# Patient Record
Sex: FEMALE | Race: WHITE | Employment: OTHER | URBAN - NONMETROPOLITAN AREA
[De-identification: names, ages, dates, MRNs, and addresses within clinical notes are randomized per-mention and may not be internally consistent; named-entity substitution may affect disease eponyms.]

---

## 2017-01-01 ENCOUNTER — HOSPITAL - GICH (OUTPATIENT)
Dept: LAB | Facility: OTHER | Age: 82
End: 2017-01-01

## 2017-01-01 ENCOUNTER — HISTORY (OUTPATIENT)
Dept: MEDSURG UNIT | Facility: OTHER | Age: 82
End: 2017-01-01

## 2017-01-01 ENCOUNTER — AMBULATORY - GICH (OUTPATIENT)
Dept: GERIATRICS | Facility: OTHER | Age: 82
End: 2017-01-01

## 2017-01-01 ENCOUNTER — COMMUNICATION - GICH (OUTPATIENT)
Dept: INTERNAL MEDICINE | Facility: OTHER | Age: 82
End: 2017-01-01

## 2017-01-01 ENCOUNTER — AMBULATORY - GICH (OUTPATIENT)
Dept: SCHEDULING | Facility: OTHER | Age: 82
End: 2017-01-01

## 2017-01-01 ENCOUNTER — COMMUNICATION - GICH (OUTPATIENT)
Dept: FAMILY MEDICINE | Facility: OTHER | Age: 82
End: 2017-01-01

## 2017-01-01 ENCOUNTER — HISTORY (OUTPATIENT)
Dept: EMERGENCY MEDICINE | Facility: OTHER | Age: 82
End: 2017-01-01

## 2017-01-01 ENCOUNTER — HISTORY (OUTPATIENT)
Dept: OBGYN | Facility: OTHER | Age: 82
End: 2017-01-01

## 2017-01-01 ENCOUNTER — OFFICE VISIT - GICH (OUTPATIENT)
Dept: OBGYN | Facility: OTHER | Age: 82
End: 2017-01-01

## 2017-01-01 DIAGNOSIS — I50.9 HEART FAILURE (H): ICD-10-CM

## 2017-01-01 DIAGNOSIS — Z00.00 ENCOUNTER FOR GENERAL ADULT MEDICAL EXAMINATION WITHOUT ABNORMAL FINDINGS: ICD-10-CM

## 2017-01-01 DIAGNOSIS — I50.40 COMBINED CONGESTIVE SYSTOLIC AND DIASTOLIC HEART FAILURE (H): ICD-10-CM

## 2017-01-01 DIAGNOSIS — E03.9 HYPOTHYROIDISM: ICD-10-CM

## 2017-01-01 DIAGNOSIS — N39.0 URINARY TRACT INFECTION: ICD-10-CM

## 2017-01-01 DIAGNOSIS — F03.90 DEMENTIA WITHOUT BEHAVIORAL DISTURBANCE (H): ICD-10-CM

## 2017-01-01 DIAGNOSIS — N95.0 POSTMENOPAUSAL BLEEDING: ICD-10-CM

## 2017-01-01 DIAGNOSIS — R30.0 DYSURIA: ICD-10-CM

## 2017-01-01 DIAGNOSIS — E11.9 TYPE 2 DIABETES MELLITUS WITHOUT COMPLICATIONS (H): ICD-10-CM

## 2017-01-01 DIAGNOSIS — E87.5 HYPERKALEMIA: ICD-10-CM

## 2017-01-01 LAB
A/G RATIO - HISTORICAL: 0.5 (ref 1–2)
ABSOLUTE BASOPHILS - HISTORICAL: 0.1 THOU/CU MM
ABSOLUTE EOSINOPHILS - HISTORICAL: 0.3 THOU/CU MM
ABSOLUTE LYMPHOCYTES - HISTORICAL: 1 THOU/CU MM (ref 0.9–2.9)
ABSOLUTE MONOCYTES - HISTORICAL: 1 THOU/CU MM
ABSOLUTE NEUTROPHILS - HISTORICAL: 4.3 THOU/CU MM (ref 1.7–7)
ALBUMIN SERPL-MCNC: 2.2 G/DL (ref 3.5–5.7)
ALP SERPL-CCNC: 119 IU/L (ref 34–104)
ALT (SGPT) - HISTORICAL: 15 IU/L (ref 7–52)
ANION GAP - HISTORICAL: 10 (ref 5–18)
ANION GAP - HISTORICAL: 4 (ref 5–18)
ANION GAP - HISTORICAL: 5 (ref 5–18)
ANION GAP - HISTORICAL: 6 (ref 5–18)
AST SERPL-CCNC: 26 IU/L (ref 13–39)
BACTERIA URINE: ABNORMAL BACTERIA/HPF
BASOPHILS # BLD AUTO: 1.4 %
BILIRUB SERPL-MCNC: 0.6 MG/DL (ref 0.3–1)
BILIRUB UR QL: NEGATIVE
BNP SERPL-MCNC: 229 PG/ML
BUN SERPL-MCNC: 32 MG/DL (ref 7–25)
BUN SERPL-MCNC: 37 MG/DL (ref 7–25)
BUN SERPL-MCNC: 46 MG/DL (ref 7–25)
BUN SERPL-MCNC: 68 MG/DL (ref 7–25)
BUN/CREAT RATIO - HISTORICAL: 19
BUN/CREAT RATIO - HISTORICAL: 21
BUN/CREAT RATIO - HISTORICAL: 23
BUN/CREAT RATIO - HISTORICAL: 32
CALCIUM SERPL-MCNC: 8 MG/DL (ref 8.6–10.3)
CALCIUM SERPL-MCNC: 8.1 MG/DL (ref 8.6–10.3)
CALCIUM SERPL-MCNC: 8.5 MG/DL (ref 8.6–10.3)
CALCIUM SERPL-MCNC: 8.6 MG/DL (ref 8.6–10.3)
CHLORIDE SERPLBLD-SCNC: 109 MMOL/L (ref 98–107)
CHLORIDE SERPLBLD-SCNC: 109 MMOL/L (ref 98–107)
CHLORIDE SERPLBLD-SCNC: 111 MMOL/L (ref 98–107)
CHLORIDE SERPLBLD-SCNC: 112 MMOL/L (ref 98–107)
CLARITY, URINE: ABNORMAL CLARITY
CO2 SERPL-SCNC: 20 MMOL/L (ref 21–31)
CO2 SERPL-SCNC: 20 MMOL/L (ref 21–31)
CO2 SERPL-SCNC: 21 MMOL/L (ref 21–31)
CO2 SERPL-SCNC: 21 MMOL/L (ref 21–31)
COLOR UR: YELLOW COLOR
CREAT SERPL-MCNC: 1.72 MG/DL (ref 0.7–1.3)
CREAT SERPL-MCNC: 1.77 MG/DL (ref 0.7–1.3)
CREAT SERPL-MCNC: 2.04 MG/DL (ref 0.7–1.3)
CREAT SERPL-MCNC: 2.15 MG/DL (ref 0.7–1.3)
EOSINOPHIL NFR BLD AUTO: 4.8 %
EPITHELIAL CELLS: ABNORMAL EPI/HPF
ERYTHROCYTE [DISTWIDTH] IN BLOOD BY AUTOMATED COUNT: 14.9 % (ref 11.5–15.5)
ESTIMATED AVERAGE GLUCOSE: 108 MG/DL
FOLATE: 7.9 NG/ML
GFR IF NOT AFRICAN AMERICAN - HISTORICAL: 22 ML/MIN/1.73M2
GFR IF NOT AFRICAN AMERICAN - HISTORICAL: 23 ML/MIN/1.73M2
GFR IF NOT AFRICAN AMERICAN - HISTORICAL: 27 ML/MIN/1.73M2
GFR IF NOT AFRICAN AMERICAN - HISTORICAL: 28 ML/MIN/1.73M2
GLOBULIN - HISTORICAL: 4.8 G/DL (ref 2–3.7)
GLUCOSE SERPL-MCNC: 124 MG/DL (ref 70–105)
GLUCOSE SERPL-MCNC: 129 MG/DL (ref 70–105)
GLUCOSE SERPL-MCNC: 133 MG/DL (ref 70–105)
GLUCOSE SERPL-MCNC: 69 MG/DL (ref 70–105)
GLUCOSE URINE: NEGATIVE MG/DL
HCT VFR BLD AUTO: 34.2 % (ref 33–51)
HEMOGLOBIN A1C MONITORING (POCT) - HISTORICAL: 5.4 % (ref 4–6.2)
HEMOGLOBIN: 10.9 G/DL (ref 12–16)
KETONES UR QL: NEGATIVE MG/DL
LEUKOCYTE ESTERASE URINE: ABNORMAL
LYMPHOCYTES NFR BLD AUTO: 15 % (ref 20–44)
MCH RBC QN AUTO: 35.2 PG (ref 26–34)
MCHC RBC AUTO-ENTMCNC: 32 G/DL (ref 32–36)
MCV RBC AUTO: 110 FL (ref 80–100)
MONOCYTES NFR BLD AUTO: 15.2 %
NEUTROPHILS NFR BLD AUTO: 63.6 % (ref 42–72)
NITRITE UR QL STRIP: NEGATIVE
OCCULT BLOOD,URINE - HISTORICAL: ABNORMAL
PH UR: 6 [PH]
PLATELET # BLD AUTO: 216 THOU/CU MM (ref 140–440)
PMV BLD: 7.2 FL (ref 6.5–11)
POTASSIUM SERPL-SCNC: 4.3 MMOL/L (ref 3.5–5.1)
POTASSIUM SERPL-SCNC: 5 MMOL/L (ref 3.5–5.1)
POTASSIUM SERPL-SCNC: 5.5 MMOL/L (ref 3.5–5.1)
POTASSIUM SERPL-SCNC: 5.9 MMOL/L (ref 3.5–5.1)
PROT SERPL-MCNC: 7 G/DL (ref 6.4–8.9)
PROTEIN QUALITATIVE,URINE - HISTORICAL: ABNORMAL MG/DL
RBC - HISTORICAL: ABNORMAL /HPF
RED BLOOD COUNT - HISTORICAL: 3.1 MIL/CU MM (ref 4–5.2)
SODIUM SERPL-SCNC: 134 MMOL/L (ref 133–143)
SODIUM SERPL-SCNC: 136 MMOL/L (ref 133–143)
SODIUM SERPL-SCNC: 139 MMOL/L (ref 133–143)
SODIUM SERPL-SCNC: 139 MMOL/L (ref 133–143)
SP GR UR STRIP: 1.01
T4 FREE SERPL-MCNC: 0.85 NG/DL (ref 0.58–1.64)
TSH - HISTORICAL: 4.89 UIU/ML (ref 0.34–5.6)
TSH - HISTORICAL: 8.22 UIU/ML (ref 0.34–5.6)
UROBILINOGEN,QUALITATIVE - HISTORICAL: NORMAL EU/DL
VIT B12 SERPL-MCNC: 816 PG/ML (ref 180–914)
WBC - HISTORICAL: >100 /HPF
WHITE BLOOD COUNT - HISTORICAL: 6.7 THOU/CU MM (ref 4.5–11)

## 2018-01-02 NOTE — H&P
Patient Information     Patient Name MRN Chely Rogers [id number] Female 1933      H&P signed by Reshma Beckett NP at 2017  7:30 AM      Author:  Reshma Beckett NP Service:  (none) Author Type:  PHYS- Nurse Practitioner     Filed:  2017  7:30 AM Encounter Date:  2017 Status:  Signed     :  Reshma Beckett NP (PHYS- Nurse Practitioner)            -  DATE OF SERVICE:  2017    RAYRAY FOX     CHIEF COMPLAINT   Increased weight gain, shortness of breath, and increased TSH.     HISTORY OF PRESENT ILLNESS  The patient was seen in December and had a reduction in her Lasix dose. She had been on 40 mg in the morning and 20 mg midday. She had had a significant weight loss since Lasix was started. Her renal function had been stable. On  her Lasix was decreased down to 20 mg twice daily. Her weight is now up 16 pounds; she has increased edema and shortness of breath. Nursing staff have noticed a slightly rapid respiratory rate. The patient denies problems. She also has history of postmenopausal bleeding and has declined workup. She does occasionally have a small amount of bleeding but not more than once every 3 weeks according to staff.     On  she had a basic metabolic panel which showed a BUN of 31, creatinine 1.42, and potassium 4.2. She is not on potassium supplementation, nor is she on any ACE inhibitors. She also recently had elevated TSH. This had returned at 8. She currently is on Synthroid 25 mcg daily.     Past medical history, allergies, and medications are reviewed.     PHYSICAL EXAMINATION  GENERAL: Pleasant, morbidly obese female without acute distress. She is sitting up in her wheelchair visiting with another resident. Alert and oriented.   VITAL SIGNS: Respiratory rate 22. Blood pressure 139/73, pulse 80, temperature 96.5. Weight reviewed and up 18 pounds since .   LUNGS: Diminished breath sounds, lower lobes.    CARDIOVASCULAR: Regular rate and rhythm. Unable to auscultate S3.   NECK: Supple and without adenopathy or thyromegaly.   ABDOMEN: Soft and obese, but without masses, tenderness, and organomegaly.   EXTREMITIES: 3+ pitting edema. She does have Ace wraps in place.     ASSESSMENT  1. Congestive heart failure.   2. Morbid obesity.   3. Hypothyroidism.   4. Postmenopausal bleeding.     PLAN  1. Increase Lasix to 40 mg twice daily for one week, then reduce back down to 40 mg in the morning, 20 mg at noon. Check basic metabolic panel in 2 weeks. Also start lisinopril 2.5 mg daily. Follow up in 2 weeks.   2. Increase Synthroid to 37.5 mcg daily and check a TSH and FT4 in 6 weeks.         SRIDHAR MEADOWS/steve   D:  2017 11:40:25  T:  2017 12:12:53  Voice Job ID:  [id number]  Text Job ID:  [id number]  cc:NURSING HOME  PCP,NO, PRIMARY PHYSICIAN  VALENCIA HILARIO MD         [facility] [place], [place]:  ASTER CHEUNG  MR#:  [id number]  :  1933  DATE:  2017  LOCATION:  [facility]  ROOM:    TYPE:  CLINURSING HOME REPORTPage 1

## 2018-01-02 NOTE — PROGRESS NOTES
Patient Information     Patient Name MRN Chely Rogers [id number] Female 1933      Progress Notes by Reshma Beckett NP at 2017  4:00 AM     Author:  Reshma Beckett NP Service:  (none) Author Type:  PHYS- Nurse Practitioner     Filed:  2017  7:54 AM Encounter Date:  2017 Status:  Signed     :  Reshma Beckett NP (PHYS- Nurse Practitioner)            This note has been dictated. The encounter number is [id number].

## 2018-01-03 NOTE — PROGRESS NOTES
Patient Information     Patient Name MRN Sex Chely Tate [id number] Female 1933      Progress Notes by Rebeca Butler MD at 2017  6:15 AM     Author:  Rebeca Butler MD Service:  (none) Author Type:  Physician     Filed:  2017  1:28 PM Encounter Date:  2017 Status:  Signed     :  Rebeca Butler MD (Physician)            60 Day Re-certification    SUBJECTIVE:  Chely Barney is a 83 y.o. Female seen for recertification.  She was started on lasix for CHF, since  Increased to 40 mg BID, she has gained 6 pounds  She lays in bed most of the day. Unwilling to get up for me today.  She feels tired but has no other concerns.  Breathing okay.  BG     Nursing Notes were reviewed.    Family was not present during this visit.    Current Outpatient Prescriptions on File Prior to Visit       Medication  Sig Dispense Refill     blood sugar diagnostic (ONETOUCH ULTRA TEST) strip Use to test blood sugar 3 times daily.       cephalexin (KEFLEX) 500 mg capsule TAKE 1 CAPSULE BY MOUTH TWICE DAILY       ferrous sulfate, 65 mg elemental, 324 mg (65 mg iron) Delayed-Release tablet Take 1 tablet by mouth once daily.  0     furosemide (LASIX) 40 mg tablet        insulin glargine (LANTUS SOLOSTAR) 100 unit/mL (3 mL) solution for injection Inject 20 units daily at bedtime       insulin lispro (HUMALOG KWIKPEN) 100 unit/mL inpn pen 6 units before breakfast, 6 units before lunch, 6 units before dinner, plus correction. TDD 24 units       lancets (ONETOUCH DELICA LANCETS) 30 gauge misc TEST THREE TIMES DAILY OR AS DIRECTED       levothyroxine (SYNTHROID) 25 mcg tablet Take 25 mcg by mouth before breakfast.       medroxyPROGESTERone (PROVERA) 10 mg tablet Take 1 tablet by mouth once daily. 30 tablet prn     Melatonin 100 % powd Take 3 mg by mouth.       miconazole nitrate powder 2 % powder Use under breasts and in buttock area for rash twice a day       pen needle, diabetic (BD  "INSULIN PEN NEEDLE UF) 31 gauge x 5/16\" Use 4 pen needles daily.       No current facility-administered medications on file prior to visit.        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     Social History Main Topics       Smoking status: Not on file     Smokeless tobacco: Not on file     Alcohol use Not on file     Drug use: Not on file     Sexual activity: Not on file     Other Topics  Concern     Not on file      Social History Narrative     Preload  8/6/2013             Social History Narrative    Preload  8/6/2013                A comprehensive review of systems was negative except for items noted in HPI/Subjective.      OBJECTIVE:  130/60  70-90    EXAM:  General Appearance: Pleasant, alert, appropriate appearance for age. No acute distress  Nose Exam: Normal external nose, mucus membranes, and septum.  OroPharynx Exam: Dental hygiene adequate. Normal buccal mucosa. Normal pharynx.  Neck Exam: Supple, no masses or nodes.  Chest/Respiratory Exam: Normal chest wall and respirations. Clear to auscultation.  Cardiovascular Exam: Regular rate and rhythm. S1, S2, no murmur, click, gallop, or rubs.  Lymphatic Exam:2+ edema lower legs  Psychiatric Exam: Normal.    ASSESSMENT/Plan :    ICD-10-CM    1. Congestive heart failure, unspecified congestive heart failure chronicity, unspecified congestive heart failure type I50.9    2. Dementia without behavioral disturbance, " unspecified dementia type F03.90    3. Type 2 diabetes mellitus without complication, without long-term current use of insulin (HC) E11.9    4. Acquired hypothyroidism E03.9        CODE STATUS/PLAN OF CARE     Chely was seen today for nursing home visit.    Diagnoses and all orders for this visit:    Congestive heart failure, unspecified congestive heart failure chronicity, unspecified congestive heart failure type    Dementia without behavioral disturbance, unspecified dementia type    Type 2 diabetes mellitus without complication, without long-term current use of insulin (HC)    Acquired hypothyroidism    Interestingly she has gained weight since increased Lasix, will have daily weights and BP sent to me next week. Consider adding zaroxyln if weight gain continues.  Increase synthroid, TSH elevated at 8.2  Rebeca Butler MD    This document was created using computer generated templates and voice activated software. Every effort has been made to assure accuracy, but errors may be present

## 2018-01-03 NOTE — PROGRESS NOTES
Patient Information     Patient Name MRN Chely Rogers [id number] Female 1933      Progress Notes by Reshma Beckett NP at 3/2/2017  3:20 AM     Author:  Reshma Beckett NP Service:  (none) Author Type:  PHYS- Nurse Practitioner     Filed:  3/6/2017  7:21 AM Encounter Date:  3/2/2017 Status:  Signed     :  Reshma Beckett NP (PHYS- Nurse Practitioner)            This note has been dictated. The encounter number is [id number].

## 2018-01-03 NOTE — PROGRESS NOTES
Patient Information     Patient Name MRN Sex Chely Tate [id number] Female 1933      Progress Notes by Jm Morin MD at 2017  2:30 PM     Author:  Jm Morin MD Service:  (none) Author Type:  Physician     Filed:  2017  6:07 PM Encounter Date:  2017 Status:  Signed     :  Jm Morin MD (Physician)            SUBJECTIVE:    Chely Barney is a 83 y.o. female who presents for ED follow up for postmenopausal bleeding.    HPI  Patient is a medically complex patient with postmenopausal bleeding brought in from her nursing home about a month ago. She has had intermittent bleeding since over a year ago. [facility] was seen [facility] and refused treatment and workup at that time. She and her family have declined intervention if she were to have uterine cancer. She and her daughter are not interested in examination or diagnosis today. She was started on Provera 10 mg daily about a month ago, and has tolerated the medication. History is provided by the patient and her daughter. Her bleeding is now light and irregular.    Allergies      Allergen   Reactions     Aleve  [Naproxen]  Itching     Itchy      Ibuprofen  *Unknown     Naproxen Sodium  Angioedema     Throat swells - Aleve       Penicillins  Edema   ,   Current Outpatient Prescriptions on File Prior to Visit       Medication  Sig Dispense Refill     blood sugar diagnostic (ONETOUCH ULTRA TEST) strip Use to test blood sugar 3 times daily.       cephalexin (KEFLEX) 500 mg capsule TAKE 1 CAPSULE BY MOUTH TWICE DAILY       ferrous sulfate, 65 mg elemental, 324 mg (65 mg iron) Delayed-Release tablet Take 1 tablet by mouth once daily.  0     furosemide (LASIX) 40 mg tablet        insulin glargine (LANTUS SOLOSTAR) 100 unit/mL (3 mL) solution for injection Inject 20 units daily at bedtime       insulin lispro (HUMALOG KWIKPEN) 100 unit/mL inpn pen 6 units before breakfast, 6 units before lunch, 6 units before dinner,  "plus correction. TDD 24 units       lancets (ONETOUCH DELICA LANCETS) 30 gauge misc TEST THREE TIMES DAILY OR AS DIRECTED       levothyroxine (SYNTHROID) 25 mcg tablet Take 25 mcg by mouth before breakfast.       medroxyPROGESTERone (PROVERA) 10 mg tablet Take 1 tablet by mouth once daily. 30 tablet prn     Melatonin 100 % powd Take 3 mg by mouth.       miconazole nitrate powder 2 % powder Use under breasts and in buttock area for rash twice a day       pen needle, diabetic (BD INSULIN PEN NEEDLE UF) 31 gauge x 5/16\" Use 4 pen needles daily.       No current facility-administered medications on file prior to visit.    , No past medical history on file., No past surgical history on file. and   Social History     Substance Use Topics       Smoking status: Never Smoker     Smokeless tobacco: Not on file     Alcohol use No       REVIEW OF SYSTEMS:  ROS    OBJECTIVE:  /70  Pulse 84  Temp 97.6  F (36.4  C) (Tympanic)  Breastfeeding? No    EXAM:   Physical Exam   Wheelchair bound.   Significant edema in the lower extremities. LE are wrapped with ACE wrap today.    ASSESSMENT/PLAN:    ICD-10-CM    1. Postmenopausal bleeding N95.0         Plan:  Discussed typically that US and endometrial biopsy could be done for diagnosis and prognosis, and then tailor oral treatment for control of her symptoms. They are not currently interested in any intervention. Would recommend continued daily progestin for a presumed endometrial hyperplasia or malignancy as she seems to be controlling the bleeding and is not symptomatic from it.  Follow up as needed.  She plans to contact her PCP regarding symptoms from CHF, and LE edema.          "

## 2018-01-03 NOTE — H&P
Patient Information     Patient Name MRN Chely Rogers [id number] Female 1933      H&P signed by Reshma Beckett NP at 3/6/2017  7:13 AM      Author:  Reshma Beckett NP Service:  (none) Author Type:  PHYS- Nurse Practitioner     Filed:  3/6/2017  7:13 AM Encounter Date:  3/2/2017 Status:  Signed     :  Reshma Beckett NP (PHYS- Nurse Practitioner)            -  DATE OF SERVICE:  2017    RAYRAY FOX      CHIEF COMPLAINT  Intermittent confusion.     HISTORY OF PRESENT ILLNESS  Nursing staff report that the patient has had intermittent confusion over the past couple of days. She spends most of her day in bed sleeping and even when she is up, she is always asking to go back to bed. This is not unusual for her. She does not do a lot of snacking. The meals that she eats are usually the only ones that are provided at the nursing home. Her weight continues to climb. It is now up 19 pounds since 2017. She had been on Lasix 40 mg twice daily, but mid-February, this was reduced down to 40 mg daily due to mild increase in her creatinine.     Primary doctor did see her at the nursing home and she started her on 2 liters fluid restriction and 2 grams sodium-reduced diet. She has chronic edema of the lower extremities, but has not had cough or shortness of breath that nursing staff have noticed. In the past when she has had intermittent confusion, she has had urinary tract infection. The patient also has postmenopausal vaginal bleeding that she has refused workup. She did pass a couple of clots yesterday according to nursing staff.     LABORATORY DATA  2017 labs showed a sodium of 139, BUN 32, creatinine 1.72 and a potassium of 4.3. 2016, hemoglobin 11.6 g/dL, A1c 5.1%. The patient is on iron twice daily. By reviewing blood sugars, these average from 93 to 198.     Past medical history, allergies, and medications are reviewed.     REVIEW  OF SYSTEMS  Complete review of systems discussed with nursing staff and resident. See HPI for positive findings.     PHYSICAL EXAMINATION  GENERAL: Morbidly obese female who is 330 pounds. She is lying in bed sleeping. She did awaken some during this visit, but did not voice any concerns.   VITAL SIGNS: Blood pressure 121/65, pulse 85, respiratory rate 18, temperature 98.1. SPO2 of 93% on room air.   SKIN: Color pink.   HEENT: Mucous membranes moist.   LUNGS: Fields clear to auscultation.   CARDIOVASCULAR/LUNG: Regular. No S3 auscultated. Heart tones and lung sounds are distant secondary to morbid obesity.   ABDOMEN: Soft and without masses, tenderness, and organomegaly.   EXTREMITIES: With chronic lymphedema.     LABORATORY DATA  Discussed in history of present illness.     ASSESSMENT  1. Confusion.   2. Weight gain.   3. Congestive heart failure.   4. Stage III chronic kidney disease.   5. Iron deficiency anemia.     PLAN  We will do a lab workup since she does have intermittent confusion. She has several chronic problems which could have caused new lab abnormalities. We will check a CBC, chemistry panel, A1c and a UA/UC. We will increase her Lasix by adding 20 mg at noon. I will follow with her weights in 2 weeks and address labs once they return. With the increased dose of Lasix, hopefully her fluid overload should improve, but will need to watch her renal function closely.    SRIDHAR MEADOWS/jarod   D:  2017 10:53:06  T:  2017 11:20:48  Voice Job ID:  [id number]  Text Job ID:  [id number]  cc:NURSING HOME  PCP,MILY, PRIMARY PHYSICIAN         [facility] & [place], [place]:  ASTER CHEUNG  MR#:  [id number]  :  1933  DATE:  2017  LOCATION:  [facility] [place]  ROOM:    TYPE:  CLINURSING [place] REPORTPage 1 of 1

## 2018-01-03 NOTE — PROGRESS NOTES
Patient Information     Patient Name MRN Chely Rogers [id number] Female 1933      Progress Notes by Reshma Beckett NP at 3/13/2017  3:40 AM     Author:  Reshma Beckett NP Service:  (none) Author Type:  PHYS- Nurse Practitioner     Filed:  3/15/2017  7:36 AM Encounter Date:  3/13/2017 Status:  Signed     :  Reshma Beckett NP (PHYS- Nurse Practitioner)            This note has been dictated. The encounter number is [phone].

## 2018-01-03 NOTE — H&P
Patient Information     Patient Name MRN Chely Rogers [id number] Female 1933      H&P signed by Reshma Beckett NP at 3/16/2017  7:29 AM      Author:  Reshma Beckett NP Service:  (none) Author Type:  PHYS- Nurse Practitioner     Filed:  3/16/2017  7:29 AM Encounter Date:  3/13/2017 Status:  Signed     :  Reshma Beckett NP (PHYS- Nurse Practitioner)            -  DATE OF SERVICE:  2017    RAYRAY FOX     CHIEF COMPLAINT   60 day recertification and followup of congestive heart failure.     HISTORY OF PRESENT ILLNESS  The patient was seen a couple of weeks ago and at that time she had Lasix dose increased from 40 mg daily to an additional 20 mg at noon. Her weight has been fluctuating between 320 and 331 pounds. She denies dyspnea. She denies cough. She is always asking to be in bed. On 2017 she had a basic metabolic panel with a slight increase of her BUN and creatinine. Her sodium was normal at 136. In January, she had labs also which showed a B natriuretic peptide of 229. In March, her hemoglobin was 10.9 g/dL with an elevated MCV of 110. She is on iron sulfate and thyroid replacement. She has also had a stable TSH. She has not had B12 or folate levels obtained. She does have iron deficiency anemia from postmenopausal bleeding. May very well have some chronic anemia and vitamin deficiencies as a cause of the macrocytosis.     Past medical history, allergies, and medications are reviewed. Complete review of systems discussed with nursing staff and resident. See HPI for positive findings.     PHYSICAL EXAMINATION  GENERAL: Pleasant, morbidly obese female without distress.   VITAL SIGNS: Blood pressure 117/55. Pulse 74, respiratory rate 18, temperature 98.1, SPO2 93% on room air.   SKIN: Color pink. Mucous membranes moist.   NECK: Supple and without adenopathy.   LUNGS: Faint rales, lower lobes bilaterally and faint wheezing. No rhonchi.    CARDIOVASCULAR: Regular. No S3 auscultated. Examination is difficult as the patient is talking throughout entire examination.   ABDOMEN: Soft and obese without masses, tenderness, and organomegaly.   EXTREMITIES: Chronic 2+ edema of the lower extremities.     LABORATORY DATA  Discussed in history of present illness.     ASSESSMENT  1. Congestive heart failure.   2. Anemia.   3. Hypothyroidism.   4. Postmenopausal bleeding.   5. Stage III chronic kidney disease.     PLAN  We will obtain chest x-ray and also check B12, folic acid and BMP this week. Once labs and chest x-ray report return, will determine if she needs to be uptitrated on diuretic. We will need to monitor her renal function carefully.      SRIDHAR MEADOWS/hima   D:  2017 15:07:19  T:  03/15/2017 15:25:40  Voice Job ID:  [id number]  Text Job ID:  [id number]  cc:NURSING HOME  PCP,MILY, PRIMARY PHYSICIAN  VALENCIA HILARIO MD         [facility] & [place], [place]:  ASTER CHEUNG  MR#:  [id number]  :  1933  DATE:  2017  LOCATION:  [facility]  ROOM:    TYPE:  CLINURSING HOME REPORTPage 1 of

## 2018-01-03 NOTE — NURSING NOTE
Patient Information     Patient Name MRN Chely Rogers [id number] Female 1933      Nursing Note by Bernabe Maria LPN at 2017  2:30 PM     Author:  Bernabe Maria LPN Service:  (none) Author Type:  NURS- Licensed Practical Nurse     Filed:  2017  3:25 PM Encounter Date:  2017 Status:  Signed     :  Bernabe Maria LPN (NURS- Licensed Practical Nurse)            Patient presents to the clinic for an ER follow up. Post menopausal bleeding.  Bernabe Maria LPN ..............2017 2:25 PM

## 2018-01-04 NOTE — PROGRESS NOTES
Patient Information     Patient Name MRN Chely Rogers [id number] Female 1933      Progress Notes by Reshma Beckett NP at 3/30/2017  4:20 AM     Author:  Reshma Beckett NP Service:  (none) Author Type:  PHYS- Nurse Practitioner     Filed:  3/31/2017 12:36 PM Encounter Date:  3/30/2017 Status:  Signed     :  Reshma Beckett NP (PHYS- Nurse Practitioner)            This note has been dictated. The encounter number is [phone].

## 2018-01-04 NOTE — H&P
Patient Information     Patient Name MRN Chely Rogers [id number] Female 1933      H&P signed by Reshma Beckett NP at 4/3/2017  7:30 AM      Author:  Reshma Beckett NP Service:  (none) Author Type:  PHYS- Nurse Practitioner     Filed:  4/3/2017  7:30 AM Encounter Date:  3/30/2017 Status:  Signed     :  Reshma Beckett NP (PHYS- Nurse Practitioner)            -  DATE OF SERVICE:  2017    RAYRAY FOX     CHIEF COMPLAINT   Followup hospitalization and patient decline.     HISTORY OF PRESENT ILLNESS  The patient was discharged from hospital last week. At the time of discharge, hospice was discussed with family. They felt they would continue to monitor mom's overall health and then decide. They are concerned because they have noticed that she is more lethargic and is not awakening. She is moaning but they do not feel she is uncomfortable. She is also having difficulty with clearing her secretions. She has a thick phlegm in her mouth. She has not been suctioned. Her daughter, Sandra, is here during this visit.     By reviewing patient's hospital admission and discharge summary, it appears as though the patient had worsening renal failure and a volume overload with congestive heart failure. Since November, her renal function has continued to decline and she has reduced from a stage III to a stage IV chronic kidney disease and most recently has hyperkalemia. Earlier this week, lisinopril of 2.5 mg was discontinued. At the time of hospitalization, her Lasix was discontinued as her renal function continued to decline. On 2017, she had labs which showed sodium of 134, BUN 68, creatinine 2.15 with a GFR of 22, potassium 5.9 and the lisinopril was stopped.     It is also to note that while she was hospitalized, she was diagnosed and treated for influenza.     Hospital labs were reviewed which showed a hemoglobin of 9.3 g/dL and an MCV of 113.     She does  now have a urinary catheter. She had some hematuria a couple of days ago and that has improved. Her urine color now is just light brown and she is having minimal output.     She is not eating or drinking. She also has a chronic, left, large 4.5 centimeters renal calculi with no hydronephrosis. The patient and family had declined previous intervention. Over the past 3 to 4 years, she also has had uterine bleeding and she has declined workup. Past medical history, allergies, and medications are reviewed. Hospital records reviewed.     PHYSICAL EXAMINATION  GENERAL: Elderly, morbidly obese female lying in bed with the head of bed elevated. She is having Cheyne-Guerrero respirations with a respiratory rate of 40. No actual apneic episodes witnessed. She is unarousable.   HEENT: She has yellow thick secretions in her mouth and is coughing, but is having difficulty clearing his secretions.   RESPIRATORY:  SPO2 is 89% on room air. Lung fields cleared, however, lung fields and heart tones are distant.   CARDIAC: Regular with a rate of 80.   ABDOMEN: Soft, without any obvious tenderness.   EXTREMITIES: Both upper and lower extremities with 2+ edema. She has worsening edema of the left upper extremity since prior to hospitalization.     She did have a ultrasound that was negative for DVT.     Blood sugars are reviewed and these average from 98 to 151.    ASSESSMENT  1. Stage IV chronic kidney disease.   2. CHF.   3. Influenza.   4. Left renal calculi.   5. Hematuria.   6. Uterine bleeding.   7. Anemia.     PLAN  After long discussion with her daughter, Sandra, they have declined hospice services but they would like her to proceed with hospice care. They want her to be comfortable. It is in agreement that she will be started on morphine sulfate 20 mg/mL to give 5 mg every 2 hours as needed for comfort.     Oxygen to start at 2 L/min via nasal cannula. Suctioning as needed for secretions and may use mouth swabs.     Discussed  with daughter if she has any further questions or concerns, that she can have nursing staff contact me.       SRIDHAR MEADOWS/jarod   D:  2017 11:40:09  T:  2017 11:58:39  Voice Job ID:  [id number]  Text Job ID:  [id number]  cc:NURSING HOME  VALENCIA HILARIO MD, PRIMARY PHYSICIAN         [facility] [place], [place]:  ASTER CHEUNG  MR#:  [id number]  :  1933  DATE:  2017  LOCATION:  [facility]  ROOM:    TYPE:  CLINURSING HOME REPORTPage 1 of

## 2018-01-04 NOTE — TELEPHONE ENCOUNTER
Patient Information     Patient Name MRN Chely Rogers [id number] Female 1933      Telephone Encounter by Noni Ely RN at 3/30/2017 11:55 AM     Author:  Noni Ely RN Service:  (none) Author Type:  NURS- Registered Nurse     Filed:  3/30/2017 11:56 AM Encounter Date:  3/30/2017 Status:  Signed     :  Noni Ely RN (NURS- Registered Nurse)            Already talked with pharmacy, they need clarification on new RX.  Pt requests physician consideration and a callback today please  NONI ELY RN ....................  3/30/2017   11:56 AM

## 2018-01-04 NOTE — TELEPHONE ENCOUNTER
Patient Information     Patient Name MRN Chely Rogers [id number] Female 1933      Telephone Encounter by Lisa Pearson at 3/28/2017  2:20 PM     Author:  Lisa Pearson Service:  (none) Author Type:  (none)     Filed:  3/28/2017  2:30 PM Encounter Date:  3/28/2017 Status:  Signed     :  Lisa Pearson            Patient daughter states was recently in hospital and is concerned of possible urinary tract infection, and edema.   Lisa Pearson LPN .............3/28/2017  2:22 PM

## 2018-01-04 NOTE — TELEPHONE ENCOUNTER
Patient Information     Patient Name MRN Chely Rogers [id number] Female 1933      Telephone Encounter by Rebeca Butler MD at 3/30/2017 10:23 AM     Author:  Rebeca Butler MD Service:  (none) Author Type:  Physician     Filed:  3/30/2017 10:25 AM Encounter Date:  3/28/2017 Status:  Signed     :  Rebeca Butler MD (Physician)            Discussed with Kathy,she will see patient today.    Rebeca Shi MD  10:23 AM 3/30/2017

## 2018-01-04 NOTE — TELEPHONE ENCOUNTER
Patient Information     Patient Name MRN Chely Rogers [id number] Female 1933      Telephone Encounter by Reshma Beckett NP at 3/31/2017  7:18 AM     Author:  Reshma Beckett NP Service:  (none) Author Type:  PHYS- Nurse Practitioner     Filed:  3/31/2017  7:18 AM Encounter Date:  3/30/2017 Status:  Signed     :  Reshma Beckett NP (PHYS- Nurse Practitioner)            Done at ET yesterday

## 2018-01-04 NOTE — TELEPHONE ENCOUNTER
Patient Information     Patient Name MRN Chely Rogers [id number] Female 1933      Telephone Encounter by Lisa Pearson at 3/29/2017 12:26 PM     Author:  Lisa Pearson Service:  (none) Author Type:  (none)     Filed:  3/29/2017 12:26 PM Encounter Date:  3/28/2017 Status:  Signed     :  Lisa Pearson            Left message to call back.  Lisa Pearson LPN ....................  3/29/2017   12:26 PM

## 2018-01-04 NOTE — TELEPHONE ENCOUNTER
Patient Information     Patient Name MRN Chely Rogers [id number] Female 1933      Telephone Encounter by Lisa Pearson at 3/28/2017  3:58 PM     Author:  Lisa Pearson Service:  (none) Author Type:  (none)     Filed:  3/28/2017  4:01 PM Encounter Date:  3/28/2017 Status:  Signed     :  Lisa Pearson            Spoke to [facility] Patient has appointment but wondering if can have orders for ua/uc. Daughter had mentioned she didn't know if Patient would make it to go to visit. [facility] dari reviewed chart but felt didn't need to be seen. Daughter upset that she wasn't seen, so NH made appointment for Thursday.  Lisa Pearson LPN .............3/28/2017  4:00 PM

## 2018-01-04 NOTE — TELEPHONE ENCOUNTER
Patient Information     Patient Name MRN Chely Rogers [id number] Female 1933      Telephone Encounter by Lisa Pearson at 3/28/2017  2:30 PM     Author:  Lisa Pearson Service:  (none) Author Type:  (none)     Filed:  3/28/2017  2:30 PM Encounter Date:  3/28/2017 Status:  Signed     :  Lisa Pearson            Left message for [place] to call back.  Lisa Pearson LPN .............3/28/2017  2:30 PM

## 2018-01-04 NOTE — TELEPHONE ENCOUNTER
Patient Information     Patient Name MRN Chely Rogers [id number] Female 1933      Telephone Encounter by Loretta Sharif at 3/29/2017 12:00 PM     Author:  Loretta Sharif Service:  (none) Author Type:  (none)     Filed:  3/29/2017 12:01 PM Encounter Date:  3/28/2017 Status:  Signed     :  Loretta Sharif            2nd request.   Family has concerns re meds and UA.   Pls call nurse.

## 2018-01-04 NOTE — TELEPHONE ENCOUNTER
Patient Information     Patient Name MRN Chely Rogers [id number] Female 1933      Telephone Encounter by Noni Kaye RN at 3/30/2017 11:33 AM     Author:  Noni Kaye RN Service:  (none) Author Type:  NURS- Registered Nurse     Filed:  3/30/2017 11:36 AM Encounter Date:  3/30/2017 Status:  Signed     :  Noni Kaye RN (NURS- Registered Nurse)            Pharmacy states they will contact Kathy Beckett at [facility], as they have questions about her recent refill of Morphine Sulfate.  No notes are in the chart about her visit today.  NONI KAYE RN ....................  3/30/2017   11:36 AM

## 2018-01-04 NOTE — TELEPHONE ENCOUNTER
Patient Information     Patient Name MRN Chely Rogers [id number] Female 1933      Telephone Encounter by Lisa Pearson at 3/30/2017  8:21 AM     Author:  Lisa Pearson Service:  (none) Author Type:  (none)     Filed:  3/30/2017  8:49 AM Encounter Date:  3/28/2017 Status:  Signed     :  Lisa Pearson            Family is wanting comfort care and debating on hospice and taking her home. Family is requesting orders for tylenol, ativan, and lasix.   Spoke to Richard Butler MD can do orders for hospice to manage comfort care or  needs to be seen by SRIDHAR Clif[place], to have orders. Thad Franco notified.   Lisa Pearson LPN .............3/30/2017  8:47 AM

## 2018-01-26 VITALS — SYSTOLIC BLOOD PRESSURE: 120 MMHG | TEMPERATURE: 97.6 F | DIASTOLIC BLOOD PRESSURE: 70 MMHG | HEART RATE: 84 BPM
